# Patient Record
Sex: MALE | Race: OTHER
[De-identification: names, ages, dates, MRNs, and addresses within clinical notes are randomized per-mention and may not be internally consistent; named-entity substitution may affect disease eponyms.]

---

## 2019-07-15 ENCOUNTER — HOSPITAL ENCOUNTER (EMERGENCY)
Dept: HOSPITAL 8 - ED | Age: 44
Discharge: HOME | End: 2019-07-15
Payer: COMMERCIAL

## 2019-07-15 VITALS — WEIGHT: 257.72 LBS | BODY MASS INDEX: 38.17 KG/M2 | HEIGHT: 69 IN

## 2019-07-15 VITALS — DIASTOLIC BLOOD PRESSURE: 79 MMHG | SYSTOLIC BLOOD PRESSURE: 143 MMHG
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
## 2019-07-15 DIAGNOSIS — S83.91XA: ICD-10-CM
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
## 2019-07-15 DIAGNOSIS — S16.1XXA: Primary | ICD-10-CM

## 2019-07-15 PROCEDURE — 99284 EMERGENCY DEPT VISIT MOD MDM: CPT

## 2019-07-15 PROCEDURE — 90715 TDAP VACCINE 7 YRS/> IM: CPT

## 2019-07-15 PROCEDURE — 90471 IMMUNIZATION ADMIN: CPT

## 2019-07-15 PROCEDURE — 72125 CT NECK SPINE W/O DYE: CPT
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
